# Patient Record
Sex: FEMALE | Race: ASIAN
[De-identification: names, ages, dates, MRNs, and addresses within clinical notes are randomized per-mention and may not be internally consistent; named-entity substitution may affect disease eponyms.]

---

## 2018-04-02 ENCOUNTER — HOSPITAL ENCOUNTER (OUTPATIENT)
Dept: HOSPITAL 62 - END | Age: 33
Discharge: HOME | End: 2018-04-02
Attending: INTERNAL MEDICINE
Payer: COMMERCIAL

## 2018-04-02 VITALS — SYSTOLIC BLOOD PRESSURE: 120 MMHG | DIASTOLIC BLOOD PRESSURE: 80 MMHG

## 2018-04-02 DIAGNOSIS — K52.9: Primary | ICD-10-CM

## 2018-04-02 DIAGNOSIS — Z87.891: ICD-10-CM

## 2018-04-02 DIAGNOSIS — K64.8: ICD-10-CM

## 2018-04-02 DIAGNOSIS — Z79.899: ICD-10-CM

## 2018-04-02 PROCEDURE — 0DBB8ZX EXCISION OF ILEUM, VIA NATURAL OR ARTIFICIAL OPENING ENDOSCOPIC, DIAGNOSTIC: ICD-10-PCS | Performed by: INTERNAL MEDICINE

## 2018-04-02 PROCEDURE — 45380 COLONOSCOPY AND BIOPSY: CPT

## 2018-04-02 PROCEDURE — 88305 TISSUE EXAM BY PATHOLOGIST: CPT

## 2018-04-02 NOTE — OPERATIVE REPORT
Operative Report


DATE OF SURGERY: 04/02/18


Operative Report: 





The risks, benefits and alternatives of the procedure including risks of 

bleeding, perforation requiring surgery are explained to the patient in detail 

and informed consent is obtained.  Patient is brought back to the endoscopy 

suite and placed in the left, lateral decubital position.  Timeout was called.  

Propofol medications administered.  A rectal examination is done which did not 

reveal any masses, tears or fissures.  An Olympus videoscope was inserted into 

the patient's rectum.  The scope was then carefully advanced all the way to the 

cecum.  The cecum was identified by the usual anatomical landmarks including 

the ileocecal valve as well as the appendiceal office.  Photodocumentation is 

obtained.  The scope was then sequentially pulled back via the various segments 

of the colon including the ascending colon, hepatic flexure, transverse colon, 

splenic flexure, descending colon and finally in to the rectosigmoid portions 

of the colon.  Retroflexion maneuvers performed.


PREOPERATIVE DIAGNOSIS: Right lower quadrant pain rule out Crohn's disease


POSTOPERATIVE DIAGNOSIS: Mild terminal ileitis status post biopsy.  Internal 

hemorrhoids


OPERATION: Colonoscopy with biopsy


SURGEON: WILLIAM YUSUF


ANESTHESIA: LMAC


TISSUE REMOVED OR ALTERED: As noted above.


COMPLICATIONS: 





None.


ESTIMATED BLOOD LOSS: None.


INTRAOPERATIVE FINDINGS: As noted above.


PROCEDURE: 





Patient tolerated procedure well.


No immediate postprocedure complications are noted.


Patient discharged in good condition.


Discharge date April 2, 2018


Discharge diet: Regular.


Discharge activity: Regular.


2-3 week follow-up to discuss findings.


Patient is instructed call the office or proceed to the emergency room should 

there be any further problems or questions.


We will went pathology.